# Patient Record
Sex: FEMALE | Race: WHITE
[De-identification: names, ages, dates, MRNs, and addresses within clinical notes are randomized per-mention and may not be internally consistent; named-entity substitution may affect disease eponyms.]

---

## 2020-05-06 ENCOUNTER — HOSPITAL ENCOUNTER (OUTPATIENT)
Dept: HOSPITAL 62 - RAD | Age: 60
End: 2020-05-06
Attending: PHYSICIAN ASSISTANT
Payer: COMMERCIAL

## 2020-05-06 DIAGNOSIS — D35.02: ICD-10-CM

## 2020-05-06 DIAGNOSIS — N13.1: Primary | ICD-10-CM

## 2020-05-06 DIAGNOSIS — N20.1: ICD-10-CM

## 2020-05-06 PROCEDURE — 82565 ASSAY OF CREATININE: CPT

## 2020-05-06 PROCEDURE — 74178 CT ABD&PLV WO CNTR FLWD CNTR: CPT

## 2020-05-06 NOTE — RADIOLOGY REPORT (SQ)
EXAM DESCRIPTION:  CT ABD/PELVIS COMBO



IMAGES COMPLETED DATE/TIME:  5/6/2020 10:52 am



REASON FOR STUDY:  R31.0 GROSS HEMATURIA R31.0  GROSS HEMATURIA



COMPARISON:  1/8/2010



TECHNIQUE:  CT scan of the abdomen and pelvis performed with and without intravenous contrast, and wi
thout oral contrast. Contrasted imaging performed helical scanning technique and dynamic intravenous 
contrast injection. Images reviewed with lung, soft tissue, and bone windows. Reconstructed coronal a
nd sagittal MPR images reviewed. Delayed images for evaluation of the urinary system also acquired. A
ll images stored on PACS.

All CT scanners at this facility use dose modulation, iterative reconstruction, and/or weight based d
osing when appropriate to reduce radiation dose to as low as reasonably achievable (ALARA).

[facility]: Dose Right  [facility]: CareDose    [facility]: Dose Right    [facility]: Teradose 4D    [facility]: Smart Technologies



CONTRAST TYPE AND DOSE:  contrast/concentration: Isovue 350.00 mg/ml; Total Contrast Delivered: 100.0
 ml; Total Saline Delivered: 72.0 ml



RENAL FUNCTION:  0.9



RADIATION DOSE:  CT Rad equipment meets quality standard of care and radiation dose reduction techniq
ues were employed. CTDIvol: 24.2 - 27.9 mGy. DLP: 4641 mGy-cm. .



LIMITATIONS:  None.



FINDINGS:  NON-CONTRASTED IMAGING: Low-attenuation left adrenal lesion measuring 3.7 cm (Hounsfield u
nits -11) compatible with adrenal adenoma.  Obstructing stone within the distal right ureter measurin
g 3 mm.  Punctate nonobstructing left lower pole stone.

POST-CONTRASTED IMAGING:

LOWER CHEST: No significant findings. No nodules or infiltrates.

LIVER: Hepatic steatosis.  No focal lesions.

SPLEEN: Normal size. No focal lesions.

PANCREAS: No masses. No significant calcifications. No adjacent inflammation or peripancreatic fluid 
collections. Pancreatic duct not dilated.

GALLBLADDER: Surgically absent.

ADRENAL GLANDS: 3.5 cm left adrenal adenoma, similar to prior.

RIGHT KIDNEY AND URETER: No solid masses.  Delayed right renal nephrogram.   3 mm obstructing stone w
ithin the distal ureter with moderate associated hydroureteronephrosis.  No additional stones.

LEFT KIDNEY AND URETER: No solid masses.   Punctate nonobstructing lower pole stone.   No hydronephro
sis or hydroureter.

AORTA AND VESSELS: Aortoiliac atherosclerosis without aneurysm. No dissection. Renal arteries, SMA, c
eliac without stenosis.

RETROPERITONEUM: No retroperitoneal adenopathy, hemorrhage or masses.

BOWEL AND PERITONEAL CAVITY: No masses or inflammatory changes. No free fluid or peritoneal masses.

APPENDIX: Normal.

PELVIS: No mass.  No free fluid. Normal bladder.

ABDOMINAL WALL: No masses. No hernias.

BONES: No significant or acute findings.

OTHER: No other significant finding.



IMPRESSION:  1.  3 mm obstructing stone within the distal right ureter with moderate hydroureteroneph
rosis and associated delayed nephrogram.  Punctate nonobstructing left lower pole stone.

2.  Hepatic steatosis.

3.  Grossly stable left adrenal adenoma.



TECHNICAL DOCUMENTATION:  JOB ID:  [id number]

Quality ID # [id number]: Final reports with documentation of one or more dose reduction techniques (e.g., Au
tomated exposure control, adjustment of the mA and/or kV according to patient size, use of iterative 
reconstruction technique)

 2011 [employer]- All Rights Reserved



Reading location - IP/workstation name: [email or web address]